# Patient Record
Sex: MALE | Race: WHITE | Employment: UNEMPLOYED | URBAN - METROPOLITAN AREA
[De-identification: names, ages, dates, MRNs, and addresses within clinical notes are randomized per-mention and may not be internally consistent; named-entity substitution may affect disease eponyms.]

---

## 2017-02-07 ENCOUNTER — HOSPITAL ENCOUNTER (EMERGENCY)
Age: 27
Discharge: HOME | End: 2017-02-07
Attending: EMERGENCY MEDICINE
Payer: MEDICARE

## 2017-02-07 VITALS
DIASTOLIC BLOOD PRESSURE: 71 MMHG | TEMPERATURE: 99 F | HEIGHT: 72 IN | BODY MASS INDEX: 39.82 KG/M2 | OXYGEN SATURATION: 94 % | WEIGHT: 294 LBS | RESPIRATION RATE: 23 BRPM | SYSTOLIC BLOOD PRESSURE: 137 MMHG | HEART RATE: 82 BPM

## 2017-02-07 DIAGNOSIS — R60.9 PERIPHERAL EDEMA: ICD-10-CM

## 2017-02-07 DIAGNOSIS — R00.0 TACHYCARDIA: Primary | ICD-10-CM

## 2017-02-07 LAB
ALBUMIN SERPL BCP-MCNC: 4.5 G/DL (ref 3.5–5)
ALBUMIN/GLOB SERPL: 1.1 {RATIO} (ref 1.2–3.5)
ALP SERPL-CCNC: 94 U/L (ref 50–136)
ALT SERPL-CCNC: 42 U/L (ref 12–65)
ANION GAP BLD CALC-SCNC: 11 MMOL/L (ref 7–16)
AST SERPL W P-5'-P-CCNC: 23 U/L (ref 15–37)
ATRIAL RATE: 115 BPM
ATRIAL RATE: 97 BPM
BASOPHILS # BLD AUTO: 0 K/UL (ref 0–0.2)
BASOPHILS # BLD: 0 % (ref 0–2)
BILIRUB SERPL-MCNC: 0.6 MG/DL (ref 0.2–1.1)
BNP SERPL-MCNC: 5 PG/ML
BUN SERPL-MCNC: 13 MG/DL (ref 6–23)
CALCIUM SERPL-MCNC: 9.4 MG/DL (ref 8.3–10.4)
CALCULATED P AXIS, ECG09: 28 DEGREES
CALCULATED P AXIS, ECG09: 70 DEGREES
CALCULATED R AXIS, ECG10: 75 DEGREES
CALCULATED R AXIS, ECG10: 85 DEGREES
CALCULATED T AXIS, ECG11: -16 DEGREES
CALCULATED T AXIS, ECG11: -25 DEGREES
CHLORIDE SERPL-SCNC: 102 MMOL/L (ref 98–107)
CO2 SERPL-SCNC: 25 MMOL/L (ref 21–32)
CREAT SERPL-MCNC: 1.06 MG/DL (ref 0.8–1.5)
DIAGNOSIS, 93000: NORMAL
DIAGNOSIS, 93000: NORMAL
DIASTOLIC BP, ECG02: NORMAL MMHG
DIASTOLIC BP, ECG02: NORMAL MMHG
DIFFERENTIAL METHOD BLD: ABNORMAL
EOSINOPHIL # BLD: 0 K/UL (ref 0–0.8)
EOSINOPHIL NFR BLD: 0 % (ref 0.5–7.8)
ERYTHROCYTE [DISTWIDTH] IN BLOOD BY AUTOMATED COUNT: 13.9 % (ref 11.9–14.6)
GLOBULIN SER CALC-MCNC: 4 G/DL (ref 2.3–3.5)
GLUCOSE SERPL-MCNC: 90 MG/DL (ref 65–100)
HCT VFR BLD AUTO: 46.8 % (ref 41.1–50.3)
HGB BLD-MCNC: 15.6 G/DL (ref 13.6–17.2)
IMM GRANULOCYTES # BLD: 0 K/UL (ref 0–0.5)
IMM GRANULOCYTES NFR BLD AUTO: 0.2 % (ref 0–5)
LYMPHOCYTES # BLD AUTO: 12 % (ref 13–44)
LYMPHOCYTES # BLD: 1.8 K/UL (ref 0.5–4.6)
MCH RBC QN AUTO: 27.7 PG (ref 26.1–32.9)
MCHC RBC AUTO-ENTMCNC: 33.3 G/DL (ref 31.4–35)
MCV RBC AUTO: 83.1 FL (ref 79.6–97.8)
MONOCYTES # BLD: 0.9 K/UL (ref 0.1–1.3)
MONOCYTES NFR BLD AUTO: 6 % (ref 4–12)
NEUTS SEG # BLD: 11.9 K/UL (ref 1.7–8.2)
NEUTS SEG NFR BLD AUTO: 82 % (ref 43–78)
P-R INTERVAL, ECG05: 142 MS
P-R INTERVAL, ECG05: 144 MS
PLATELET # BLD AUTO: 316 K/UL (ref 150–450)
PMV BLD AUTO: 11.5 FL (ref 10.8–14.1)
POTASSIUM SERPL-SCNC: 3.5 MMOL/L (ref 3.5–5.1)
PROT SERPL-MCNC: 8.5 G/DL (ref 6.3–8.2)
Q-T INTERVAL, ECG07: 312 MS
Q-T INTERVAL, ECG07: 340 MS
QRS DURATION, ECG06: 94 MS
QRS DURATION, ECG06: 94 MS
QTC CALCULATION (BEZET), ECG08: 431 MS
QTC CALCULATION (BEZET), ECG08: 431 MS
RBC # BLD AUTO: 5.63 M/UL (ref 4.23–5.67)
SODIUM SERPL-SCNC: 138 MMOL/L (ref 136–145)
SYSTOLIC BP, ECG01: NORMAL MMHG
SYSTOLIC BP, ECG01: NORMAL MMHG
TSH SERPL DL<=0.005 MIU/L-ACNC: 2.43 UIU/ML (ref 0.36–3.74)
VENTRICULAR RATE, ECG03: 115 BPM
VENTRICULAR RATE, ECG03: 97 BPM
WBC # BLD AUTO: 14.6 K/UL (ref 4.3–11.1)

## 2017-02-07 PROCEDURE — 99284 EMERGENCY DEPT VISIT MOD MDM: CPT | Performed by: EMERGENCY MEDICINE

## 2017-02-07 PROCEDURE — 93005 ELECTROCARDIOGRAM TRACING: CPT | Performed by: EMERGENCY MEDICINE

## 2017-02-07 PROCEDURE — 96376 TX/PRO/DX INJ SAME DRUG ADON: CPT | Performed by: EMERGENCY MEDICINE

## 2017-02-07 PROCEDURE — 96374 THER/PROPH/DIAG INJ IV PUSH: CPT | Performed by: EMERGENCY MEDICINE

## 2017-02-07 PROCEDURE — 80053 COMPREHEN METABOLIC PANEL: CPT | Performed by: EMERGENCY MEDICINE

## 2017-02-07 PROCEDURE — 85025 COMPLETE CBC W/AUTO DIFF WBC: CPT | Performed by: EMERGENCY MEDICINE

## 2017-02-07 PROCEDURE — 84443 ASSAY THYROID STIM HORMONE: CPT | Performed by: EMERGENCY MEDICINE

## 2017-02-07 PROCEDURE — 36415 COLL VENOUS BLD VENIPUNCTURE: CPT | Performed by: EMERGENCY MEDICINE

## 2017-02-07 PROCEDURE — 74011250637 HC RX REV CODE- 250/637: Performed by: EMERGENCY MEDICINE

## 2017-02-07 PROCEDURE — 83880 ASSAY OF NATRIURETIC PEPTIDE: CPT | Performed by: EMERGENCY MEDICINE

## 2017-02-07 PROCEDURE — 74011000250 HC RX REV CODE- 250: Performed by: EMERGENCY MEDICINE

## 2017-02-07 RX ORDER — FUROSEMIDE 40 MG/1
40 TABLET ORAL
Status: COMPLETED | OUTPATIENT
Start: 2017-02-07 | End: 2017-02-07

## 2017-02-07 RX ORDER — HYDROCHLOROTHIAZIDE 25 MG/1
25 TABLET ORAL DAILY
Qty: 5 TAB | Refills: 0 | Status: SHIPPED | OUTPATIENT
Start: 2017-02-07 | End: 2017-02-12

## 2017-02-07 RX ORDER — METOPROLOL TARTRATE 50 MG/1
50 TABLET ORAL
Status: COMPLETED | OUTPATIENT
Start: 2017-02-07 | End: 2017-02-07

## 2017-02-07 RX ORDER — METOPROLOL TARTRATE 5 MG/5ML
5 INJECTION INTRAVENOUS ONCE
Status: COMPLETED | OUTPATIENT
Start: 2017-02-07 | End: 2017-02-07

## 2017-02-07 RX ORDER — SODIUM CHLORIDE 0.9 % (FLUSH) 0.9 %
5-10 SYRINGE (ML) INJECTION AS NEEDED
Status: DISCONTINUED | OUTPATIENT
Start: 2017-02-07 | End: 2017-02-07 | Stop reason: HOSPADM

## 2017-02-07 RX ORDER — SODIUM CHLORIDE 0.9 % (FLUSH) 0.9 %
5-10 SYRINGE (ML) INJECTION EVERY 8 HOURS
Status: DISCONTINUED | OUTPATIENT
Start: 2017-02-07 | End: 2017-02-07 | Stop reason: HOSPADM

## 2017-02-07 RX ADMIN — METOPROLOL TARTRATE 50 MG: 50 TABLET ORAL at 20:11

## 2017-02-07 RX ADMIN — METOPROLOL TARTRATE 5 MG: 5 INJECTION INTRAVENOUS at 20:12

## 2017-02-07 RX ADMIN — Medication 5 ML: at 18:49

## 2017-02-07 RX ADMIN — METOPROLOL TARTRATE 5 MG: 5 INJECTION INTRAVENOUS at 18:47

## 2017-02-07 RX ADMIN — FUROSEMIDE 40 MG: 40 TABLET ORAL at 20:32

## 2017-02-07 NOTE — ED TRIAGE NOTES
Pt arrives POV from home c/o swelling to BLE and BUE with nausea and rapid HR. Pt seen by PCP and given Metoprolol for tachycardia but has not started it yet. States began feeling worse with nausea and swelling and came to ED. Denies pain.

## 2017-02-08 NOTE — DISCHARGE INSTRUCTIONS
Change the metoprolol that his doctor wrote this morning to twice a day, every 12 hours. Next Dose tomorrow morning     follow-up with dipstick cardiology, that should be calling you tomorrow  Hopefully they can get your  Echo and Holter started before the Friday appointment, if not they will hopefully try to get him seen sooner on Wednesday or Thursday    Start the hydrochlorothiazide in the morning, for the swelling    Cardiac Arrhythmia: Care Instructions  Your Care Instructions    A cardiac arrhythmia is a change in the normal rhythm of the heart. Your heart may beat too fast or too slow or beat with an irregular or skipping rhythm. A change in the heart's rhythm may feel like a really strong heartbeat or a fluttering in your chest. A severe heart rhythm problem can keep the body from getting the blood it needs. This can result in shortness of breath, lightheadedness, and fainting. You may take medicine to treat your condition. Your doctor may recommend a pacemaker or recommend catheter ablation to destroy small parts of the heart that are causing a rhythm problem. Another possible treatment is an implantable cardioverter-defibrillator (ICD). An ICD is a device that gives the heart a shock to return the heart to a normal rhythm. Follow-up care is a key part of your treatment and safety. Be sure to make and go to all appointments, and call your doctor if you are having problems. It's also a good idea to know your test results and keep a list of the medicines you take. How can you care for yourself at home? General care  · Be safe with medicines. Take your medicines exactly as prescribed. Call your doctor if you think you are having a problem with your medicine. You will get more details on the specific medicines your doctor prescribes. · If you received a pacemaker or an ICD, you will get a fact sheet about it. · Wear medical alert jewelry that says you have an abnormal heart rhythm.  You can buy this at most drugstores. Lifestyle changes  · Eat a heart-healthy diet. · Stay at a healthy weight. Lose weight if you need to. · Avoid nicotine, too much alcohol, and illegal drugs (meth, speed, and cocaine). Also, get enough sleep and do not overeat. · Ask your doctor whether you can take over-the-counter medicines (such as decongestants). These can make your heart beat fast.  · Talk to your doctor about any limits to activities, such as driving, or tasks where you use power tools or ladders. Activity  · Start light exercise if your doctor says you can. Even a small amount will help you get stronger, have more energy, and manage your stress. · If your doctor recommends it, get more exercise. Walking is a good choice. Bit by bit, increase the amount you walk every day. Try for at least 30 minutes on most days of the week. You also may want to swim, bike, or do other activities. · When you exercise, watch for signs that your heart is working too hard. You are pushing too hard if you cannot talk while you exercise. If you become short of breath or dizzy or have chest pain, sit down and rest.  · Check your pulse daily. Place two fingers on the artery at the palm side of your wrist, in line with your thumb. If your heartbeat seems uneven, talk to your doctor. When should you call for help? Call 911 anytime you think you may need emergency care. For example, call if:  · You passed out (lost consciousness). · You have symptoms of a heart attack. These may include:  ¨ Chest pain or pressure, or a strange feeling in the chest.  ¨ Sweating. ¨ Shortness of breath. ¨ Nausea or vomiting. ¨ Pain, pressure, or a strange feeling in the back, neck, jaw, or upper belly or in one or both shoulders or arms. ¨ Lightheadedness or sudden weakness. ¨ A fast or irregular heartbeat. After you call 911, the  may tell you to chew 1 adult-strength or 2 to 4 low-dose aspirin. Wait for an ambulance.  Do not try to drive yourself. · You have signs of a stroke. These include:  ¨ Sudden numbness, paralysis, or weakness in your face, arm, or leg, especially on only one side of your body. ¨ New problems with walking or balance. ¨ Sudden vision changes. ¨ Drooling or slurred speech. ¨ New problems speaking or understanding simple statements, or feeling confused. ¨ A sudden, severe headache that is different from past headaches. Call your doctor now or seek immediate medical care if:  · You are dizzy or lightheaded, or you feel like you may faint. · You have new or increased shortness of breath. · You had surgery and you have signs of infection, such as:  ¨ Increased pain, swelling, warmth, or redness. ¨ Red streaks leading from the cut (incision). ¨ Pus draining from the incision. ¨ A fever. Watch closely for changes in your health, and be sure to contact your doctor if:  · You do not get better as expected. Where can you learn more? Go to http://farrah-arya.info/. Enter Z080 in the search box to learn more about \"Cardiac Arrhythmia: Care Instructions. \"  Current as of: May 5, 2016  Content Version: 11.1  © 4175-5831 [email or web address]. Care instructions adapted under license by [employer] (which disclaims liability or warranty for this information). If you have questions about a medical condition or this instruction, always ask your healthcare professional. [place] [address] any warranty or liability for your use of this information. Leg and Ankle Edema: Care Instructions  Your Care Instructions  Swelling in the legs, ankles, and feet is called edema. It is common after you sit or stand for a while. Long plane flights or car rides often cause swelling in the legs and feet. You may also have swelling if you have to stand for long periods of time at your job. Problems with the veins in the legs (varicose veins) and changes in hormones can also cause swelling. Sometimes the swelling in the ankles and feet is caused by a more serious problem, such as heart failure, infection, blood clots, or liver or kidney disease. Follow-up care is a key part of your treatment and safety. Be sure to make and go to all appointments, and call your doctor if you are having problems. Its also a good idea to know your test results and keep a list of the medicines you take. How can you care for yourself at home? · If your doctor gave you medicine, take it as prescribed. Call your doctor if you think you are having a problem with your medicine. · Whenever you are resting, raise your legs up. Try to keep the swollen area higher than the level of your heart. · Take breaks from standing or sitting in one position. ¨ Walk around to increase the blood flow in your lower legs. ¨ Move your feet and ankles often while you stand, or tighten and relax your leg muscles. · Wear support stockings. Put them on in the morning, before swelling gets worse. · Eat a balanced diet. Lose weight if you need to. · Limit the amount of salt (sodium) in your diet. Salt holds fluid in the body and may increase swelling. When should you call for help? Call 911 anytime you think you may need emergency care. For example, call if:  · You have symptoms of a blood clot in your lung (called a pulmonary embolism). These may include:  ¨ Sudden chest pain. ¨ Trouble breathing. ¨ Coughing up blood. Call your doctor now or seek immediate medical care if:  · You have signs of a blood clot, such as:  ¨ Pain in your calf, back of the knee, thigh, or groin. ¨ Redness and swelling in your leg or groin. · You have symptoms of infection, such as:  ¨ Increased pain, swelling, warmth, or redness. ¨ Red streaks or pus. ¨ A fever. Watch closely for changes in your health, and be sure to contact your doctor if:  · Your swelling is getting worse. · You have new or worsening pain in your legs.   · You do not get better as expected. Where can you learn more? Go to http://farrah-arya.info/. Enter G595 in the search box to learn more about \"Leg and Ankle Edema: Care Instructions. \"  Current as of: May 27, 2016  Content Version: 11.1  © 0418-5034 [employer]. Care instructions adapted under license by [employer] (which disclaims liability or warranty for this information). If you have questions about a medical condition or this instruction, always ask your healthcare professional. [address] any warranty or liability for your use of this information.

## 2017-02-08 NOTE — ED PROVIDER NOTES
HPI Comments: Patient presents with palpitations and heart rates up to 140 and 150. Seen in urgent care about a week ago and placed on Augmentin for sinus infection. Heart rate was noted to be high again. Patient then followed up with PCP today heart rate was noted to be in the 140s 150s, blood work was drawn and a referral to cardiology was fixing to be initiated but family has not heard from cardiology yet  Patient has been on a stable dose of levothyroxine versus hypothyroidism    Denies chest pain or dyspnea, he does feel a bit unsettled by the fast heart rate. Patient is a 31 y.o. male presenting with swelling. The history is provided by the patient and a parent. Swelling    This is a new problem. The problem occurs constantly. The problem has been gradually worsening. Pain location: face, hands, feet. The patient is experiencing no pain. Associated symptoms include nausea. Pertinent negatives include no fever, no diarrhea, no vomiting, no dysuria, no frequency, no headaches, no arthralgias, no chest pain and no back pain. Past Medical History:   Diagnosis Date    Acute sinusitis     ADHD (attention deficit hyperactivity disorder) 11/23/2016    Anxiety     Essential hypertension 11/23/2016    Hyperlipidemia 11/23/2016    Hypothyroidism 11/23/2016    Morbid obesity ([address] 11/23/2016    Psychiatric disorder 11/23/2016     --EXACT NATURE UNKNOWN       Past Surgical History:   Procedure Laterality Date    Hx heent Left      AT AGE 5         Family History:   Problem Relation Age of Onset    Diabetes Father     Hypertension Father        Social History     Social History    Marital status: SINGLE     Spouse name: N/A    Number of children: N/A    Years of education: N/A     Occupational History    Not on file.      Social History Main Topics    Smoking status: Unknown If Ever Smoked    Smokeless tobacco: Not on file    Alcohol use No    Drug use: No    Sexual activity: Not on file Other Topics Concern    Not on file     Social History Narrative    No narrative on file         ALLERGIES: Review of patient's allergies indicates no known allergies. Review of Systems   Constitutional: Negative for chills and fever. HENT: Positive for congestion and facial swelling. Negative for rhinorrhea and sore throat. Eyes: Negative for discharge and redness. Respiratory: Negative for cough and shortness of breath. Cardiovascular: Positive for palpitations and leg swelling. Negative for chest pain. Gastrointestinal: Positive for nausea. Negative for abdominal pain, diarrhea and vomiting. Genitourinary: Negative for dysuria and frequency. Musculoskeletal: Negative for arthralgias and back pain. Skin: Negative for rash. Neurological: Negative for dizziness and headaches. All other systems reviewed and are negative. Vitals:    02/07/17 1930 02/07/17 1945 02/07/17 2000 02/07/17 2009   BP: 126/64 126/66 126/67 141/67   Pulse: 95 99 (!) 102 99   Resp:    16   Temp:       SpO2: 93% 94% 96% 95%   Weight:       Height:                Physical Exam   Constitutional: He is oriented to person, place, and time. He appears well-developed and well-nourished. HENT:   Head: Normocephalic and atraumatic. Mouth/Throat: Oropharynx is clear and moist. No oropharyngeal exudate. Mild facial swelling about the neck and parotid areas bilaterally. Nonpitting  No erythema or induration to suggest infection   Eyes: Conjunctivae and EOM are normal. Pupils are equal, round, and reactive to light. Right eye exhibits no discharge. Left eye exhibits no discharge. No scleral icterus. Neck: Normal range of motion. Neck supple. Cardiovascular: Normal rate, regular rhythm and normal heart sounds. Exam reveals no gallop. No murmur heard. Pulmonary/Chest: Effort normal and breath sounds normal. No respiratory distress. He has no wheezes. He has no rales. Abdominal: Soft.  Bowel sounds are normal. There is no tenderness. There is no guarding. Musculoskeletal: Normal range of motion. He exhibits no edema. Mild nonpitting edema to hands and feet   Neurological: He is alert and oriented to person, place, and time. He exhibits normal muscle tone. cni 2-12 grossly   Skin: Skin is warm and dry. He is not diaphoretic. Psychiatric: He has a normal mood and affect. His behavior is normal.   Nursing note and vitals reviewed. MDM  Number of Diagnoses or Management Options  Peripheral edema: Tachycardia:   Diagnosis management comments: Medical decision making note:  Palpitations to 150 with nonpitting peripheral edema  EKG is sinus tach, TSH is not overly suppressed  Case and EKGs reviewed with cardiology who confirms sinus tach, patient can be seen Friday perhaps with a pre-clinic echo which has been requested by the referral 1. May be able to be seen sooner without pre-clinic testing. Patient feels better after IV Lopressor heart rate now in the 90s  They've been instructed to take the daily Lopressor that was prescribed this morning twice a day instead. This concludes the \"medical decision making note\" part of this emergency department visit note.          Amount and/or Complexity of Data Reviewed  Tests in the radiology section of CPT®: (No orders to display  )  Tests in the medicine section of CPT®: ordered and reviewed (Results Include:    Recent Results (from the past 24 hour(s))  -EKG, 12 LEAD, INITIAL  Collection Time: 02/07/17  4:31 PM       Result                                            Value                         Ref Range                       Systolic BP                                                                     mmHg                            Diastolic BP                                                                    mmHg                            Ventricular Rate                                  115                           BPM                             Atrial Rate 115                           BPM                             P-R Interval                                      144                           ms                              QRS Duration                                      94                            ms                              Q-T Interval                                      312                           ms                              QTC Calculation (Bezet)                           431                           ms                              Calculated P Axis                                 70                            degrees                         Calculated R Axis                                 85                            degrees                         Calculated T Axis                                 -16                           degrees                         Diagnosis                                                                                                   Sinus tachycardia T wave abnormality, consider inferior ischemia Abnormal ECG No previous ECGs available   -CBC WITH AUTOMATED DIFF  Collection Time: 02/07/17  4:40 PM       Result                                            Value                         Ref Range                       WBC                                               14.6 (H)                      4.3 - 11.1 K/uL                 RBC                                               5.63                          4.23 - 5.67 M/uL                HGB                                               15.6                          13.6 - 17.2 g/dL                HCT                                               46.8                          41.1 - 50.3 %                   MCV                                               83.1                          79.6 - 97.8 FL                  MCH                                               27.7                          26.1 - 32.9 PG MCHC                                              33.3                          31.4 - 35.0 g/dL                RDW                                               13.9                          11.9 - 14.6 %                   PLATELET                                          316                           150 - 450 K/uL                  MPV                                               11.5                          10.8 - 14.1 FL                  DF                                                AUTOMATED                                                     NEUTROPHILS                                       82 (H)                        43 - 78 %                       LYMPHOCYTES                                       12 (L)                        13 - 44 %                       MONOCYTES                                         6                             4.0 - 12.0 %                    EOSINOPHILS                                       0 (L)                         0.5 - 7.8 %                     BASOPHILS                                         0                             0.0 - 2.0 %                     IMMATURE GRANULOCYTES                             0.2                           0.0 - 5.0 %                     ABS. NEUTROPHILS                                  11.9 (H)                      1.7 - 8.2 K/UL                  ABS. LYMPHOCYTES                                  1.8                           0.5 - 4.6 K/UL                  ABS. MONOCYTES                                    0.9                           0.1 - 1.3 K/UL                  ABS. EOSINOPHILS                                  0.0                           0.0 - 0.8 K/UL                  ABS. BASOPHILS                                    0.0                           0.0 - 0.2 K/UL                  ABS. IMM.  GRANS.                                  0.0                           0.0 - 0.5 K/UL             -METABOLIC PANEL, COMPREHENSIVE  Collection Time: 02/07/17  4:40 PM       Result                                            Value                         Ref Range                       Sodium                                            138                           136 - 145 mmol/L                Potassium                                         3.5                           3.5 - 5.1 mmol/L                Chloride                                          102                           98 - 107 mmol/L                 CO2                                               25                            21 - 32 mmol/L                  Anion gap                                         11                            7 - 16 mmol/L                   Glucose                                           90                            65 - 100 mg/dL                  BUN                                               13                            6 - 23 MG/DL                    Creatinine                                        1.06                          0.8 - 1.5 MG/DL                 GFR est AA                                        >60                           >60 ml/min/1.73m2               GFR est non-AA                                    >60                           >60 ml/min/1.73m2               Calcium                                           9.4                           8.3 - 10.4 MG/DL                Bilirubin, total                                  0.6                           0.2 - 1.1 MG/DL                 ALT (SGPT)                                        42                            12 - 65 U/L                     AST (SGOT)                                        23                            15 - 37 U/L                     Alk. phosphatase                                  94                            50 - 136 U/L                    Protein, total                                    8.5 (H)                       6.3 - 8.2 g/dL                  Albumin 4.5                           3.5 - 5.0 g/dL                  Globulin                                          4.0 (H)                       2.3 - 3.5 g/dL                  A-G Ratio                                         1.1 (L)                       1.2 - 3.5                  -BNP  Collection Time: 02/07/17  4:40 PM       Result                                            Value                         Ref Range                       BNP                                               5                             pg/mL                      -TSH 3RD GENERATION  Collection Time: 02/07/17  4:40 PM       Result                                            Value                         Ref Range                       TSH                                               2.430                         0.358 - 3.740 uIU/mL       )      ED Course       Procedures

## 2017-02-08 NOTE — ED NOTES
Patient discharged home ambulatory with family. Patient given medication, discharge, and follow up instructions. Patient verbalized understanding and had no questions.

## 2017-02-10 PROBLEM — R60.9 PERIPHERAL EDEMA: Status: ACTIVE | Noted: 2017-02-10

## 2017-02-10 PROBLEM — R00.0 TACHYCARDIA: Status: ACTIVE | Noted: 2017-02-10

## 2017-10-09 PROBLEM — E66.01 OBESITY, CLASS III, BMI 40-49.9 (MORBID OBESITY) (HCC): Status: ACTIVE | Noted: 2017-10-09

## 2018-06-08 PROBLEM — Z71.3 DIETARY COUNSELING: Status: ACTIVE | Noted: 2018-06-08

## 2018-06-08 PROBLEM — E03.9 ACQUIRED HYPOTHYROIDISM: Status: ACTIVE | Noted: 2018-06-08

## 2018-12-14 PROBLEM — Z00.00 MEDICARE ANNUAL WELLNESS VISIT, SUBSEQUENT: Status: ACTIVE | Noted: 2018-12-14

## 2019-09-23 PROBLEM — I10 ESSENTIAL HYPERTENSION: Status: ACTIVE | Noted: 2019-09-23

## 2019-09-23 PROBLEM — Z23 ENCOUNTER FOR IMMUNIZATION: Status: ACTIVE | Noted: 2019-09-23

## 2019-10-23 PROBLEM — Z23 ENCOUNTER FOR IMMUNIZATION: Status: RESOLVED | Noted: 2019-09-23 | Resolved: 2019-10-23

## 2020-01-06 PROBLEM — R06.83 SNORING: Status: ACTIVE | Noted: 2020-01-06

## 2020-01-06 PROBLEM — R29.818 SUSPECTED SLEEP APNEA: Status: ACTIVE | Noted: 2020-01-06

## 2020-01-19 ENCOUNTER — HOSPITAL ENCOUNTER (OUTPATIENT)
Dept: SLEEP MEDICINE | Age: 30
Discharge: HOME | End: 2020-01-19
Payer: MEDICARE

## 2020-01-19 PROCEDURE — 95811 POLYSOM 6/>YRS CPAP 4/> PARM: CPT

## 2020-01-20 ENCOUNTER — HOSPITAL ENCOUNTER (OUTPATIENT)
Dept: NUTRITION | Age: 30
Discharge: HOME | End: 2020-01-20
Attending: PSYCHIATRY & NEUROLOGY
Payer: MEDICARE

## 2020-01-20 PROCEDURE — 97802 MEDICAL NUTRITION INDIV IN: CPT

## 2020-01-20 NOTE — PROGRESS NOTES
NUTRITION ASSESSMENT  Patient History:  Pt referred by practitioner Delmar Gonzalez with diagnosis of morbid obesity with BMI of 45-49.9. Medical history remarkable for HTN, high cholesterol, hypothyroid, ADHD, and psychiatric disorder- exact nature unknown, and suspected sleep apnea. Family medical history remarkable for father having diabetes. Father is present to appt today to bring patient. Father notes pt has a mental disability. Pt's mother and patient filled out nutrition assessment forms per father. · Previous Dieting Attempts/Current Knowledge: Has attempted no diets in the past. Voices poor knowledge regarding healthy food choices and no prior nutrition education. Father however states that since father has diabetes, pt's family attempts to guide pt in appropriate food choices and portions. Father reports that the patient has a tendency to go back for seconds at meal times, to go to the kitchen after family has gone to bed and have snacks.  Lifestyle/Cultural/Family Influence: Father notes that pt is unemployed r/t to disability. Family prepares meals for pt but pt makes decisions about how much to eat. Father states he tries to encourage pt to have healthier intake.  Motivation: When asked, pt notes he is interested in a \"healthy eating plan. \"     Support: Father, mother     Barriers: food-and nutrition-related knowledge deficit. Pt's disability may present a barrier. Potentially boredom/emotional eating is a barrier.  Strategies to Address Barriers: See Nutrition Counseling and Motivational Interviewing (as below). Stage of Change (Transtheoretical Model): Behaviors indicate current stage of change is: Preparation/ Contemplation. Unable to determine if pt is ready for action steps. Pt notes he can Mercedes. \" Unable to determine pt is ready to begin steps for compliance with recommendations.       Anthropometrics:   Ht: 6' and 5'9\" both in EMR; Pt family note 6'; referring provider note 5'9\" (1.753m)  Wt:304lb (137.9kg) EMR   BMI: 44.9 (Obesity class III)   IBW for ht: 160lb +/- 10%   Weight History:  WT / BMI WEIGHT BODY MASS INDEX   1/6/2020 304 lb 44.89 kg/m2   12/16/2019 315 lb 46.52 kg/m2   9/23/2019 304 lb 44.89 kg/m2   6/25/2019 300 lb 44.3 kg/m2   5/28/2019 301 lb 44.45 kg/m2   3/19/2019 298 lb 44.01 kg/m2   12/14/2018 292 lb 43.12 kg/m2   12/4/2018 292 lb 3.2 oz 43.15 kg/m2   9/14/2018 284 lb 41.94 kg/m2   9/11/2018 285 lb 42.09 kg/m2   6/19/2018 295 lb 3.2 oz 43.59 kg/m2     Nutrition Focused Physical Findings:   N/A. Nutritionally Relevant Medications:  metoprolol  amlodipine  simvastatin  Synthroid    Supplements/Vitamins/Herbs:  none    Nutritionally Relevant Labs (1/6/20):  Glucose 127   Triglyceride 171    Physical Activity:  None; Pt reports mainly sitting throughout the day    Sleep Habits:  7-8 hrs sleep/night recorded by pt; father notes pt is up late; often sleeps for a period in the day hours    Food and Nutrient Intake:   Based on reported usual intake, pt consumes 3meals/day with frequent snacks. Dines out at 1-2 times/ week. Appears able to obtain appropriate nutritional choices as desired. Father states healthy foods are available in the home. Diet Recall:  Breakfast: 2 pieces of toast  Lunch: chicken salad; crackers  Dinner: Manwich beef sandwich; pork-and-beans, chips  Snacks: popcorn, Little Chante cakes  Beverages: 8-16 oz water/day, 6 (12oz) diet Pepsisoda/day, occasional sweet tea, 0oz coffee/day    Diet Appears:   low in calcium containing choices   low in servings of fruits and vegetables   low in fiber   low in protein at breakfast   high in added sugar   low in heart healthy fats    Food Allergies/Intolerances: none.     Estimated Nutritional Needs:  EER: 2797 kcal/day [place] [place] factor 1.2 +/- 10% margin of error; Range (0834-9245 kcal/day) for current weight  EER for weight loss: 2500 - 500 = 2000 kcal/day  Carbohydrates: 200 g/day (40% of kcal) or 60 g/meal(3) with 15-30 g/snack(1) (Approximately 600kcal/meal with 200 kcal/snack)  Protein: 125g/day (25% of kcal)  Fat: 78 g/day (35% of kcal) Heart- healthy fats emphasized with lists given. Fluids: 2 L/day (1 ml/kcal)       NUTRITION DIAGNOSIS:   Obesity r/t history of excessive oral intake and food and nutrition-related knowledge deficit regarding appropriate food choices and eating patterns to maintain healthy weight as evidenced by food recall and eating pattern described and BMI 44.9. NUTRITION INTERVENTION:  Appointment length: 60 minutes. Nutrition Education:  Purpose of nutrition education: To provide pt with education to increase knowledge for healthful po intake and to provide a meal plan/ meal template as an educational resource for reference in building healthy, balanced meals and snacks. Recommended modifications:   · Eat 3 balanced meals per with 1 snack eating every 4 hours. · Take 20 minutes minimum for meal enjoyment and leave behind any bites of food after pt feels full and satisfied. · 80/20 rule for healthy food choices/ fun food choices     CONTENT for meal planning: The Plate Method:  · Educated pt on balancing intake of macronutrients at meals and snacks. Discussed the importance of adequate intake of all macronutrients for satiety and satisfaction. · Discussed proportions on plate: 1/2 non-starchy vegetables, 1/4 protein, 1/4 starch. Reviewed additional CHO servings for pt. Stressed the importance of healthy fat with every meal (lists given)  · Demonstrated appropriate portion sizes for various food groups and strategies for portion control. · Carbohydrates: Reviewed food groups containing CHO, provided food examples. Encouraged obtaining CHO from a variety of food groups to vary micronutrient intake, choosing less processed carbs with higher fiber.  Encouraged decreasing intake of refined CHO foods/beverages with limited nutritional value and/or those high in added sugar. Reviewed list of starchy vegetables. · Protein: Reviewed food groups containing protein, provided food examples. Stressed the importance of adequate protein intake with each meal/snack to promote satiety following meals and maintenance of lean body mass. · Fat: Educated pt on importance of essential fats in diet. Advised of food sources. Explained the need to increase sources of omega 3 fats and gave products to look for. Educated pt on trans fats and harm caused with consumption. APPLICATION:  Skill development using Meal Plan:  · Provided pt with the MyPlate Template meal planner (as above) and educated pt on individualized portions. Used hand models and measuring cups to demonstrate appropriate portion sizes. Encouraged pt to use the meal plan as a teaching tool learn about food groups and appropriate portion sizes for present activity level. Advised using portions as a guide and to rely on kcal level if needed when dining out. · Explained to pt how to use the meal planner sheets with the meal plan. RD reviewed with father and pt 3 sample meals composed in office using meal planner. Nutrition Counseling: Motivational Interviewing:      Explained to pt the relationship between food choices and health and reducing risk for disease. Explained to pt the impact of over-nutrition and excessive intake of inflammatory foods/ processed foods in the disease process for chronic disease.  Discussed pts intake and activity patterns as above. Reviewed 3 broad areas that impact weight: food choices, physical activity level, and energy balance. Advised that weight management should be viewed as a learning process and best accomplished by making small goals that pt can continue with for life. Essentially building health lifestyle that will facilitate weight loss and maintenance.  Discussed in length the need to add physical activity to pt's daily routine (MD may need to advise).  Encouraged pt to find something her enjoys; father notes that the patient can attend a gym that they have membership for.  Advised pt on the qualities of individuals who are able to lose weight and maintain that wt loss. Highlighted that maintainers tend to continue in the dietary patterns and physical activity patterns they established in order to lose the weight. Advised designing an approach with small goals that pt can practically plan on continuing for life (new behaviors). Encouraged a focus on healthful intake and to avoid a focus on weight numbers.  Engaged pt in a discussion of flexibility for food choices balanced with a focus on health. Recommended an 80/20 or 90/10  approach for healthful intake verses fun foods. Self-monitoring:   · Advised pt that goals of this program are to educate pt an appropriate intake and nutrition planning for health and weight management and to address barriers to action steps. RD support in the first months of behavior change can help with successful transition to self-monitoring. The long term goal is to develop self- monitoring, self-regulating behaviors for life-long management skills. Advised pt that lifestyle changes will usually be required. · Encouraged pt to use meal planner sheets for a time to self-monitor intake compared to plans. Will review for pt if available at f/u. Cognitive restructuring:   · Discussed complete health (physical, emotional, social, vocational, intellectual, and spiritual) and how all of these areas affect each other and also can affect eating habits when individual areas of health may need to be addressed. · Pt's po intake patterns as described by father indicates barriers as boredom eating/ emotional eating. This may affect attempts with behavior changes. Goal Setting:  Pt was somewhat reluctant to committing to specific agreement to goal  RD Goal: Pt will eat 3 balanced meals per with 1 snack eating every 4 hours.   Plan: Pt will use MyPlate template as a guide to structure eating in timing, food choices, macronutrient amounts, and portion sizes for goal stated (as above). RD Goal: Pt will increase frequency and consistency of physical activity to facilitate wt loss. Plan: RD encourage pt to find activities that he may enjoy; introduced to pt as a way to engage his mind in an activity to bring stimulation rahter than using food intake as stimulation. Materials Given:  Weight Management Basics  MyPlate Template Meal Plan with portions for 2000kcal/day  Meal Planner Sheets  Healthy Eating Food Guide (NIH)   Mindful Eating Handout    MONITORING AND EVALUATION:  RECOMMENDATIONS FOR CONTINUED APPOINTMENTS/ SUPPORT:  · Pt and father were attentive during appointment. Father interjected often noting pt's excessive intake. Unsure of impact on pt to hear comments. It appears pt will consider the information given but will need continued support and education. · Pt would benefit from continued support with appts with dietitian until action steps are established for a minimum of 6 months. · Frequency depends on any setbacks or need for support. · Best practice guidelines indicate pts with most success follow up with RD:  regularly, every 2 weeks, especially when beginning new dietary changes (2013 AHA/ACC/TOS Guideline for the Management of Overweight and Obesity in Adults). Follow up appt: Pt did not schedule. Follow-up Monitoring Plans: Will call pt in 2 weeks per pt anf father request to check on any f/u needs.     Billy Leblanc MS, RD,CSSD, LD  W: [phone]

## 2020-01-29 PROBLEM — G47.10 HYPERSOMNIA: Status: ACTIVE | Noted: 2020-01-29

## 2020-01-29 PROBLEM — G47.33 OSA (OBSTRUCTIVE SLEEP APNEA): Status: ACTIVE | Noted: 2020-01-06

## 2020-01-29 PROBLEM — G47.34 NOCTURNAL HYPOXEMIA: Status: ACTIVE | Noted: 2020-01-29

## 2020-02-12 ENCOUNTER — DOCUMENTATION ONLY (OUTPATIENT)
Dept: NUTRITION | Age: 30
End: 2020-02-12

## 2020-02-12 NOTE — PROGRESS NOTES
Nutrition Counseling:  Pt referred by Dr. Keyla Savage. Made attempt to speak with pt's family regarding any f/u needs. Left VM to request a return call.     Elizabeth Last, MS, RD, CSSD, LD  W: [phone]

## 2020-04-27 PROBLEM — G47.33 SEVERE OBSTRUCTIVE SLEEP APNEA: Status: ACTIVE | Noted: 2020-01-06

## 2020-04-27 PROBLEM — G47.33 OBSTRUCTIVE SLEEP APNEA SYNDROME: Status: RESOLVED | Noted: 2020-01-06 | Resolved: 2020-04-27

## 2020-06-08 ENCOUNTER — DOCUMENTATION ONLY (OUTPATIENT)
Dept: NUTRITION | Age: 30
End: 2020-06-08

## 2020-06-08 NOTE — PROGRESS NOTES
Nutrition Counseling:  Pt referred by Dr. Sandy Innocent. . Pt's family has not returned a call to schedule a f/u appt. Will close referral on this end.     Grace Rinne, MS, [address], [address], LD  W: [phone]  C: [phone]

## 2022-03-18 PROBLEM — G47.34 NOCTURNAL HYPOXEMIA: Status: ACTIVE | Noted: 2020-01-29

## 2022-03-19 PROBLEM — Z00.00 MEDICARE ANNUAL WELLNESS VISIT, SUBSEQUENT: Status: ACTIVE | Noted: 2018-12-14

## 2022-03-19 PROBLEM — Z71.3 DIETARY COUNSELING: Status: ACTIVE | Noted: 2018-06-08

## 2022-03-19 PROBLEM — G47.10 HYPERSOMNIA: Status: ACTIVE | Noted: 2020-01-29

## 2022-03-19 PROBLEM — E03.9 ACQUIRED HYPOTHYROIDISM: Status: ACTIVE | Noted: 2018-06-08

## 2022-03-19 PROBLEM — I10 ESSENTIAL HYPERTENSION: Status: ACTIVE | Noted: 2019-09-23

## 2022-03-19 PROBLEM — R60.9 PERIPHERAL EDEMA: Status: ACTIVE | Noted: 2017-02-10

## 2022-03-19 PROBLEM — R60.0 PERIPHERAL EDEMA: Status: ACTIVE | Noted: 2017-02-10

## 2022-03-20 PROBLEM — G47.33 OSA (OBSTRUCTIVE SLEEP APNEA): Status: ACTIVE | Noted: 2020-01-06

## 2022-03-20 PROBLEM — R00.0 TACHYCARDIA: Status: ACTIVE | Noted: 2017-02-10

## 2022-05-16 PROBLEM — J30.9 ALLERGIC RHINITIS: Status: ACTIVE | Noted: 2022-05-16

## 2022-08-17 ENCOUNTER — TELEMEDICINE (OUTPATIENT)
Dept: PRIMARY CARE CLINIC | Facility: CLINIC | Age: 32
End: 2022-08-17
Payer: MEDICARE

## 2022-08-17 DIAGNOSIS — E66.01 MORBID (SEVERE) OBESITY DUE TO EXCESS CALORIES (HCC): ICD-10-CM

## 2022-08-17 DIAGNOSIS — Z71.3 DIETARY COUNSELING AND SURVEILLANCE: ICD-10-CM

## 2022-08-17 DIAGNOSIS — J30.9 ALLERGIC RHINITIS, UNSPECIFIED SEASONALITY, UNSPECIFIED TRIGGER: ICD-10-CM

## 2022-08-17 DIAGNOSIS — G47.33 OBSTRUCTIVE SLEEP APNEA (ADULT) (PEDIATRIC): ICD-10-CM

## 2022-08-17 DIAGNOSIS — E78.5 HYPERLIPIDEMIA, UNSPECIFIED HYPERLIPIDEMIA TYPE: ICD-10-CM

## 2022-08-17 DIAGNOSIS — I10 ESSENTIAL (PRIMARY) HYPERTENSION: Primary | ICD-10-CM

## 2022-08-17 DIAGNOSIS — E03.9 HYPOTHYROIDISM, UNSPECIFIED TYPE: ICD-10-CM

## 2022-08-17 PROCEDURE — G8428 CUR MEDS NOT DOCUMENT: HCPCS | Performed by: FAMILY MEDICINE

## 2022-08-17 PROCEDURE — G8421 BMI NOT CALCULATED: HCPCS | Performed by: FAMILY MEDICINE

## 2022-08-17 PROCEDURE — 99214 OFFICE O/P EST MOD 30 MIN: CPT | Performed by: FAMILY MEDICINE

## 2022-08-17 PROCEDURE — 4004F PT TOBACCO SCREEN RCVD TLK: CPT | Performed by: FAMILY MEDICINE

## 2022-08-17 RX ORDER — AMLODIPINE BESYLATE 10 MG/1
10 TABLET ORAL DAILY
Qty: 90 TABLET | Refills: 0 | Status: SHIPPED | OUTPATIENT
Start: 2022-08-17

## 2022-08-17 RX ORDER — LEVOTHYROXINE SODIUM 0.07 MG/1
75 TABLET ORAL DAILY
Qty: 90 TABLET | Refills: 0 | Status: SHIPPED | OUTPATIENT
Start: 2022-08-17

## 2022-08-17 RX ORDER — SIMVASTATIN 40 MG
40 TABLET ORAL NIGHTLY
Qty: 90 TABLET | Refills: 0 | Status: SHIPPED | OUTPATIENT
Start: 2022-08-17

## 2022-08-17 NOTE — PROGRESS NOTES
bp 120/70    [facility] - JEAN PIERRE Herronyrann marieien [address] [place] [address] [address]  Office : [phone]  Fax : [phone]      Pt was seen by synchronous (real-time) audio-video technology   I was at my home office while conducting this encounter  Pts  healthcare decision maker is aware that this patient-initiated Telehealth encounter is a billable service, with coverage as determined by her insurance carrier. She is aware that she may receive a bill and has provided verbal consent to proceed:         Subjective: The patient is a 27 y.o. male  who presents for f/u on multiple chronic medical conditions-good compliance with medications-no new concerns-pt here to get routeine labs and need refill on meds. no cardiopulmonary symptoms  Hypertension--Pt BP been controlled with meds  Prediabetes -pts blood sugar stable on med  Hyperlipidemia--pts on low carb diet and low fat diet -stable on med  Gerd -stable on diet /med  Thyroid problem- stable  Pt uses cpap for JATIN  Pt not done lab work for a while-pt to do the same soon          Patient Active Problem List   Diagnosis Code    ADHD (attention deficit hyperactivity disorder) F90.9    Hyperlipidemia E78.5    Morbid obesity with BMI of 40.0-44.9, adult ([address] E66.01, Z68.41    Psychiatric disorder F99    Tachycardia R00.0    Peripheral edema R60.9    Acquired hypothyroidism E03.9    Dietary counseling Z71.3    Medicare annual wellness visit, subsequent Z00.00    BMI 40.0-44.9, adult ([address] Z68.41    Essential hypertension I10    JATIN (obstructive sleep apnea) G47.33    Nocturnal hypoxemia G47.34    Hypersomnia G47.10       Past Medical History:   Diagnosis Date    Acute sinusitis     ADHD (attention deficit hyperactivity disorder) 11/23/2016    Anxiety     Essential hypertension 11/23/2016    Hyperlipidemia 11/23/2016    Hypothyroidism 11/23/2016    Morbid obesity ([address] 11/23/2016    Psychiatric disorder 11/23/2016    --EXACT NATURE UNKNOWN    Tachycardia 2/10/2017 Past Surgical History:   Procedure Laterality Date    HX HEENT Left     AT AGE 5       Social History     Socioeconomic History    Marital status: SINGLE     Spouse name: Not on file    Number of children: Not on file    Years of education: Not on file    Highest education level: Not on file   Occupational History    Not on file   Tobacco Use    Smoking status: Never Smoker    Smokeless tobacco: Never Used   Substance and Sexual Activity    Alcohol use: No    Drug use: No    Sexual activity: Not on file   Other Topics Concern    Not on file   Social History Narrative    Not on file     Social Determinants of Health     Financial Resource Strain:     Difficulty of Paying Living Expenses: Not on file   Food Insecurity:     Worried About [employer] [employer] in the Last Year: Not on file    Ran Out of Food in the Last Year: Not on file   Transportation Needs:     Lack of Transportation (Medical): Not on file    Lack of Transportation (Non-Medical):  Not on file   Physical Activity:     Days of Exercise per Week: Not on file    Minutes of Exercise per Session: Not on file   Stress:     Feeling of Stress : Not on file   Social Connections:     Frequency of Communication with Friends and Family: Not on file    Frequency of Social Gatherings with Friends and Family: Not on file    Attends [employer] Services: Not on file    Active Member of Clubs or Organizations: Not on file    Attends Club or Organization Meetings: Not on file    Marital Status: Not on file   Intimate Partner Violence:     Fear of Current or Ex-Partner: Not on file    Emotionally Abused: Not on file    Physically Abused: Not on file    Sexually Abused: Not on file   Housing Stability:     Unable to Pay for Housing in the Last Year: Not on file    Number of [place] in the Last Year: Not on file    Unstable Housing in the Last Year: Not on file       No Known Allergies    Current Outpatient Medications   Medication Sig Dispense Refill levothyroxine (SYNTHROID) 75 mcg tablet TAKE 1 TABLET BY MOUTH ONCE DAILY BEFORE  BREAKFAST 90 Tablet 0    amLODIPine (NORVASC) 10 mg tablet Take 1 Tablet by mouth daily. 90 Tablet 0    metoprolol tartrate (LOPRESSOR) 25 mg tablet Take 1 Tablet by mouth two (2) times a day. 180 Tablet 0    simvastatin (ZOCOR) 40 mg tablet TAKE 1 TABLET BY MOUTH ONCE DAILY AT  NIGHT 90 Tablet 0    atomoxetine (STRATTERA) 80 mg capsule Take 1 Cap by mouth daily. Indications: attention deficit disorder with hyperactivity 90 Cap 1         Review of Systems   Constitutional: Negative. HENT: Negative. Eyes: Negative. Respiratory: Negative. Cardiovascular: Negative. Gastrointestinal: Negative. Genitourinary: Negative. Musculoskeletal: Negative. Skin: Negative. Neurological: Negative. Endo/Heme/Allergies: Negative. Psychiatric/Behavioral: Negative. Add           Objective: There were no vitals taken for this visit. Physical Exam   Constitutional: He is oriented to person, place, and time. He appears well-developed. Obese     HENT:   Head: Atraumatic. Right Ear: External ear normal.   Left Ear: External ear normal.   Eyes: EOM are normal.   Neck: Normal range of motion. Pulmonary/Chest: Effort normal.     Neurological: He is alert and oriented to person, place, and time. Elena Remedies Psychiatric: He has a normal mood and affect. His behavior is normal. Judgment normal.         . RESULTRCNTNC(15W    ASSESSMENT/PLAN:    )  Chronic Conditions Addressed Today       1. ADHD (attention deficit hyperactivity disorder)     Overview      Seeing psychiatrist  Stable on med          Relevant Orders     METABOLIC PANEL, COMPREHENSIVE     LIPID PANEL     T4, FREE     TSH 3RD GENERATION    2.  Hyperlipidemia     Overview      Stable on statin          Relevant Medications     simvastatin (ZOCOR) 40 mg tablet     Other Relevant Orders     METABOLIC PANEL, COMPREHENSIVE     LIPID PANEL     T4, FREE     TSH 3RD GENERATION    3. Acquired hypothyroidism     Overview      STABLE ON MED  LABS TODAY  REFILLED          Relevant Medications     levothyroxine (SYNTHROID) 75 mcg tablet     simvastatin (ZOCOR) 40 mg tablet     Other Relevant Orders     METABOLIC PANEL, COMPREHENSIVE     LIPID PANEL     T4, FREE     TSH 3RD GENERATION    4. Dietary counseling     Overview      Low calorie dist discussed          Relevant Orders     METABOLIC PANEL, COMPREHENSIVE     LIPID PANEL     T4, FREE     TSH 3RD GENERATION    5. Morbid obesity with BMI of 40.0-44.9, adult (HCC)     Overview      Weight loss encouraged          Relevant Medications     levothyroxine (SYNTHROID) 75 mcg tablet     Other Relevant Orders     METABOLIC PANEL, COMPREHENSIVE     LIPID PANEL     T4, FREE     TSH 3RD GENERATION    6. Essential hypertension - Primary     Overview      Stable with med  Refilled  Labs   Annual eye exam recommended  F/u in 3 months            Relevant Medications     amLODIPine (NORVASC) 10 mg tablet     metoprolol tartrate (LOPRESSOR) 25 mg tablet     simvastatin (ZOCOR) 40 mg tablet     Other Relevant Orders     METABOLIC PANEL, COMPREHENSIVE     LIPID PANEL     T4, FREE     TSH 3RD GENERATION    7. JATIN (obstructive sleep apnea)     Overview      Sleep study          Relevant Orders     METABOLIC PANEL, COMPREHENSIVE     LIPID PANEL     T4, FREE     TSH 3RD GENERATION          Orders Placed This Encounter    COMPREHENSIVE METABOLIC PANEL    LIPID PANEL    T4, FREE    TSH 3RD GENERATION    levothyroxine (SYNTHROID) 75 mcg tablet     Sig: TAKE 1 TABLET BY MOUTH ONCE DAILY BEFORE  BREAKFAST     Dispense:  90 Tablet     Refill:  0    amLODIPine (NORVASC) 10 mg tablet     Sig: Take 1 Tablet by mouth daily. Dispense:  90 Tablet     Refill:  0     Please consider 90 day supplies to promote better adherence    metoprolol tartrate (LOPRESSOR) 25 mg tablet     Sig: Take 1 Tablet by mouth two (2) times a day.      Dispense:  180 Tablet Refill:  0    simvastatin (ZOCOR) 40 mg tablet     Sig: TAKE 1 TABLET BY MOUTH ONCE DAILY AT  NIGHT     Dispense:  90 Tablet     Refill:  0     Results for orders placed or performed in visit on 01/06/20   TSH 3RD GENERATION   Result Value Ref Range    TSH 3.910 0.450 - 0.043 uIU/mL   METABOLIC PANEL, COMPREHENSIVE   Result Value Ref Range    Glucose 127 (H) 65 - 99 mg/dL    BUN 13 6 - 20 mg/dL    Creatinine 0.95 0.76 - 1.27 mg/dL    GFR est non- >59 mL/min/1.73    GFR est  >59 mL/min/1.73    BUN/Creatinine ratio 14 9 - 20    Sodium 145 (H) 134 - 144 mmol/L    Potassium 4.1 3.5 - 5.2 mmol/L    Chloride 106 96 - 106 mmol/L    CO2 19 (L) 20 - 29 mmol/L    Calcium 10.2 8.7 - 10.2 mg/dL    Protein, total 7.9 6.0 - 8.5 g/dL    Albumin 5.0 3.5 - 5.5 g/dL    GLOBULIN, TOTAL 2.9 1.5 - 4.5 g/dL    A-G Ratio 1.7 1.2 - 2.2    Bilirubin, total 0.4 0.0 - 1.2 mg/dL    Alk. phosphatase 76 39 - 117 IU/L    AST (SGOT) 22 0 - 40 IU/L    ALT (SGPT) 40 0 - 44 IU/L   LIPID PANEL   Result Value Ref Range    Cholesterol, total 178 100 - 199 mg/dL    Triglyceride 171 (H) 0 - 149 mg/dL    HDL Cholesterol 41 >39 mg/dL    VLDL, calculated 34 5 - 40 mg/dL    LDL, calculated 103 (H) 0 - 99 mg/dL   T4, FREE   Result Value Ref Range    T4, Free 1.73 0.82 - 1.77 ng/dL       Follow-up and Dispositions    Return in about 3 months (around 5/15/2022). Due to this being a TeleHealth evaluation, many elements of the physical examination are unable to be assessed. We discussed the expected course, resolution and complications of the diagnosis(es) in detail. Medication risks, benefits, costs, interactions, and alternatives were discussed as indicated. I advised her to contact the office if her condition worsens, changes or fails to improve as anticipated. She expressed understanding with the diagnosis(es) and plan.          Pursuant to the emergency declaration under the [address] [place], [address] waiver authority and the [employer] Act, this Virtual  Visit was conducted, with patient's consent, to reduce the patient's risk of exposure to COVID-19 and provide continuity of care for an established patient. Services were provided through a video synchronous discussion virtually to substitute for in-person clinic visit. Kerrie Bucio MD

## 2022-11-12 DIAGNOSIS — E78.5 HYPERLIPIDEMIA, UNSPECIFIED HYPERLIPIDEMIA TYPE: ICD-10-CM

## 2022-11-12 DIAGNOSIS — E03.9 HYPOTHYROIDISM, UNSPECIFIED TYPE: ICD-10-CM

## 2022-11-12 DIAGNOSIS — I10 ESSENTIAL (PRIMARY) HYPERTENSION: ICD-10-CM

## 2022-11-17 RX ORDER — SIMVASTATIN 40 MG
TABLET ORAL
Qty: 90 TABLET | Refills: 0 | OUTPATIENT
Start: 2022-11-17

## 2022-11-17 RX ORDER — AMLODIPINE BESYLATE 10 MG/1
TABLET ORAL
Qty: 90 TABLET | Refills: 0 | OUTPATIENT
Start: 2022-11-17

## 2022-11-17 RX ORDER — LEVOTHYROXINE SODIUM 0.07 MG/1
TABLET ORAL
Qty: 90 TABLET | Refills: 0 | OUTPATIENT
Start: 2022-11-17

## 2022-12-22 ENCOUNTER — TELEPHONE (OUTPATIENT)
Dept: PRIMARY CARE CLINIC | Facility: CLINIC | Age: 32
End: 2022-12-22

## 2022-12-23 DIAGNOSIS — I10 ESSENTIAL (PRIMARY) HYPERTENSION: ICD-10-CM

## 2022-12-23 RX ORDER — AMLODIPINE BESYLATE 10 MG/1
10 TABLET ORAL DAILY
Qty: 90 TABLET | Refills: 0 | Status: SHIPPED | OUTPATIENT
Start: 2022-12-23

## 2022-12-23 NOTE — TELEPHONE ENCOUNTER
----- Message from Adam Jorgensen sent at 12/22/2022 10:09 AM EST -----  Subject: Refill Request    QUESTIONS  Name of Medication? amLODIPine (NORVASC) 10 MG tablet  Patient-reported dosage and instructions? 10 mg daily   How many days do you have left? 0  Preferred Pharmacy? [address]  Pharmacy phone number (if available)? [phone]  Additional Information for Provider? Pt has appt on 01/06  ---------------------------------------------------------------------------  --------------  CALL BACK INFO  What is the best way for the office to contact you? OK to leave message on   voicemail  Preferred Call Back Phone Number? [phone]  ---------------------------------------------------------------------------  --------------  SCRIPT ANSWERS  Relationship to Patient?  Self

## 2022-12-23 NOTE — TELEPHONE ENCOUNTER
Refill request   Amlodipine 10 mg - Take 1 tablet by mouth daily    Patient has a f./u appointment scheduled for 1/6/23 with Dr. Veronique Hanley   Please send RX to [facility] on [address]  in [place]      Rx pended   Thank you!

## 2023-03-27 ENCOUNTER — TELEMEDICINE (OUTPATIENT)
Dept: PRIMARY CARE CLINIC | Facility: CLINIC | Age: 33
End: 2023-03-27
Payer: MEDICARE

## 2023-03-27 DIAGNOSIS — I10 ESSENTIAL (PRIMARY) HYPERTENSION: ICD-10-CM

## 2023-03-27 DIAGNOSIS — G47.33 OBSTRUCTIVE SLEEP APNEA (ADULT) (PEDIATRIC): ICD-10-CM

## 2023-03-27 DIAGNOSIS — Z71.3 DIETARY COUNSELING AND SURVEILLANCE: ICD-10-CM

## 2023-03-27 DIAGNOSIS — Z00.00 MEDICARE ANNUAL WELLNESS VISIT, SUBSEQUENT: Primary | ICD-10-CM

## 2023-03-27 DIAGNOSIS — J30.9 ALLERGIC RHINITIS, UNSPECIFIED SEASONALITY, UNSPECIFIED TRIGGER: ICD-10-CM

## 2023-03-27 DIAGNOSIS — F90.0 ATTENTION-DEFICIT HYPERACTIVITY DISORDER, PREDOMINANTLY INATTENTIVE TYPE: ICD-10-CM

## 2023-03-27 DIAGNOSIS — E03.9 HYPOTHYROIDISM, UNSPECIFIED TYPE: ICD-10-CM

## 2023-03-27 DIAGNOSIS — E66.01 MORBID (SEVERE) OBESITY DUE TO EXCESS CALORIES (HCC): ICD-10-CM

## 2023-03-27 DIAGNOSIS — E78.5 HYPERLIPIDEMIA, UNSPECIFIED HYPERLIPIDEMIA TYPE: ICD-10-CM

## 2023-03-27 PROCEDURE — G8421 BMI NOT CALCULATED: HCPCS | Performed by: FAMILY MEDICINE

## 2023-03-27 PROCEDURE — G0439 PPPS, SUBSEQ VISIT: HCPCS | Performed by: FAMILY MEDICINE

## 2023-03-27 PROCEDURE — G8484 FLU IMMUNIZE NO ADMIN: HCPCS | Performed by: FAMILY MEDICINE

## 2023-03-27 PROCEDURE — 99214 OFFICE O/P EST MOD 30 MIN: CPT | Performed by: FAMILY MEDICINE

## 2023-03-27 PROCEDURE — G8427 DOCREV CUR MEDS BY ELIG CLIN: HCPCS | Performed by: FAMILY MEDICINE

## 2023-03-27 PROCEDURE — 4004F PT TOBACCO SCREEN RCVD TLK: CPT | Performed by: FAMILY MEDICINE

## 2023-03-27 RX ORDER — LEVOTHYROXINE SODIUM 0.07 MG/1
75 TABLET ORAL DAILY
Qty: 90 TABLET | Refills: 0 | Status: SHIPPED | OUTPATIENT
Start: 2023-03-27

## 2023-03-27 RX ORDER — AMLODIPINE BESYLATE 10 MG/1
10 TABLET ORAL DAILY
Qty: 90 TABLET | Refills: 0 | Status: SHIPPED | OUTPATIENT
Start: 2023-03-27

## 2023-03-27 RX ORDER — SIMVASTATIN 40 MG
40 TABLET ORAL NIGHTLY
Qty: 90 TABLET | Refills: 0 | Status: SHIPPED | OUTPATIENT
Start: 2023-03-27

## 2023-03-27 SDOH — ECONOMIC STABILITY: HOUSING INSECURITY
IN THE LAST 12 MONTHS, WAS THERE A TIME WHEN YOU DID NOT HAVE A STEADY PLACE TO SLEEP OR SLEPT IN A SHELTER (INCLUDING NOW)?: NO

## 2023-03-27 SDOH — ECONOMIC STABILITY: FOOD INSECURITY: WITHIN THE PAST 12 MONTHS, THE FOOD YOU BOUGHT JUST DIDN'T LAST AND YOU DIDN'T HAVE MONEY TO GET MORE.: NEVER TRUE

## 2023-03-27 SDOH — ECONOMIC STABILITY: FOOD INSECURITY: WITHIN THE PAST 12 MONTHS, YOU WORRIED THAT YOUR FOOD WOULD RUN OUT BEFORE YOU GOT MONEY TO BUY MORE.: NEVER TRUE

## 2023-03-27 SDOH — ECONOMIC STABILITY: INCOME INSECURITY: HOW HARD IS IT FOR YOU TO PAY FOR THE VERY BASICS LIKE FOOD, HOUSING, MEDICAL CARE, AND HEATING?: NOT HARD AT ALL

## 2023-03-27 ASSESSMENT — LIFESTYLE VARIABLES: HOW OFTEN DO YOU HAVE A DRINK CONTAINING ALCOHOL: NEVER

## 2023-03-27 ASSESSMENT — PATIENT HEALTH QUESTIONNAIRE - PHQ9
SUM OF ALL RESPONSES TO PHQ QUESTIONS 1-9: 0
2. FEELING DOWN, DEPRESSED OR HOPELESS: 0
SUM OF ALL RESPONSES TO PHQ9 QUESTIONS 1 & 2: 0
1. LITTLE INTEREST OR PLEASURE IN DOING THINGS: 0
2. FEELING DOWN, DEPRESSED OR HOPELESS: 0
SUM OF ALL RESPONSES TO PHQ9 QUESTIONS 1 & 2: 0
SUM OF ALL RESPONSES TO PHQ QUESTIONS 1-9: 0
1. LITTLE INTEREST OR PLEASURE IN DOING THINGS: 0
SUM OF ALL RESPONSES TO PHQ QUESTIONS 1-9: 0
SUM OF ALL RESPONSES TO PHQ QUESTIONS 1-9: 0

## 2023-03-27 NOTE — PROGRESS NOTES
Comprehensive Metabolic Panel; Future  -     TSH with Reflex; Future  -     Lipid Panel; Future  8. Obstructive sleep apnea (adult) (pediatric)  Overview:  Sleep study      9. Attention-deficit hyperactivity disorder, predominantly inattentive type  Overview:  Seeing psychiatrist  Stable on med         Orders Placed This Encounter    COMPREHENSIVE METABOLIC PANEL    LIPID PANEL    T4, FREE    TSH 3RD GENERATION    levothyroxine (SYNTHROID) 75 mcg tablet     Sig: TAKE 1 TABLET BY MOUTH ONCE DAILY BEFORE  BREAKFAST     Dispense:  90 Tablet     Refill:  0    amLODIPine (NORVASC) 10 mg tablet     Sig: Take 1 Tablet by mouth daily. Dispense:  90 Tablet     Refill:  0     Please consider 90 day supplies to promote better adherence    metoprolol tartrate (LOPRESSOR) 25 mg tablet     Sig: Take 1 Tablet by mouth two (2) times a day. Dispense:  180 Tablet     Refill:  0    simvastatin (ZOCOR) 40 mg tablet     Sig: TAKE 1 TABLET BY MOUTH ONCE DAILY AT  NIGHT     Dispense:  90 Tablet     Refill:  0     Results for orders placed or performed in visit on 01/06/20   TSH 3RD GENERATION   Result Value Ref Range    TSH 3.910 0.450 - 7.659 uIU/mL   METABOLIC PANEL, COMPREHENSIVE   Result Value Ref Range    Glucose 127 (H) 65 - 99 mg/dL    BUN 13 6 - 20 mg/dL    Creatinine 0.95 0.76 - 1.27 mg/dL    GFR est non- >59 mL/min/1.73    GFR est  >59 mL/min/1.73    BUN/Creatinine ratio 14 9 - 20    Sodium 145 (H) 134 - 144 mmol/L    Potassium 4.1 3.5 - 5.2 mmol/L    Chloride 106 96 - 106 mmol/L    CO2 19 (L) 20 - 29 mmol/L    Calcium 10.2 8.7 - 10.2 mg/dL    Protein, total 7.9 6.0 - 8.5 g/dL    Albumin 5.0 3.5 - 5.5 g/dL    GLOBULIN, TOTAL 2.9 1.5 - 4.5 g/dL    A-G Ratio 1.7 1.2 - 2.2    Bilirubin, total 0.4 0.0 - 1.2 mg/dL    Alk.  phosphatase 76 39 - 117 IU/L    AST (SGOT) 22 0 - 40 IU/L    ALT (SGPT) 40 0 - 44 IU/L   LIPID PANEL   Result Value Ref Range    Cholesterol, total 178 100 - 199 mg/dL    Triglyceride 171 (H) 0 -

## 2023-03-27 NOTE — PATIENT INSTRUCTIONS
[place] [employer] online. You need 0711-1672 mg of calcium and 7927-1835 IU of vitamin D per day. It is possible to meet your calcium requirement with diet alone, but a vitamin D supplement is usually necessary to meet this goal.  When exposed to the sun, use a sunscreen that protects against both UVA and UVB radiation with an SPF of 30 or greater. Reapply every 2 to 3 hours or after sweating, drying off with a towel, or swimming. Always wear a seat belt when traveling in a car. Always wear a helmet when riding a bicycle or motorcycle.

## 2023-05-04 ENCOUNTER — TELEPHONE (OUTPATIENT)
Dept: PRIMARY CARE CLINIC | Facility: CLINIC | Age: 33
End: 2023-05-04

## 2023-05-04 NOTE — TELEPHONE ENCOUNTER
----- Message from Mark Graham sent at 5/1/2023  9:37 AM EDT -----  Subject: Message to Provider    QUESTIONS  Information for Provider? Pt would like PCP to write an order for a C Pap   Machine (for pt sleep apena). This needs to be sent to [facility] in [facility]. Fax number? [phone]. Please contact once fax has   been sent.   ---------------------------------------------------------------------------  --------------  [facility]  [phone]; OK to leave message on voicemail  ---------------------------------------------------------------------------  --------------  SCRIPT ANSWERS  Relationship to Patient?  Self

## 2023-05-04 NOTE — TELEPHONE ENCOUNTER
Patient has a sleep doctor. I advised patient to call the specialist to request the prescription. Dr. Mehran Harvey does not prescribe CPAP supplies.

## 2023-06-15 DIAGNOSIS — E78.5 HYPERLIPIDEMIA, UNSPECIFIED HYPERLIPIDEMIA TYPE: ICD-10-CM

## 2023-06-15 DIAGNOSIS — I10 ESSENTIAL (PRIMARY) HYPERTENSION: ICD-10-CM

## 2023-06-15 DIAGNOSIS — E03.9 HYPOTHYROIDISM, UNSPECIFIED TYPE: ICD-10-CM

## 2023-06-15 RX ORDER — AMLODIPINE BESYLATE 10 MG/1
TABLET ORAL
Qty: 90 TABLET | Refills: 0 | OUTPATIENT
Start: 2023-06-15

## 2023-06-15 RX ORDER — SIMVASTATIN 40 MG
TABLET ORAL
Qty: 90 TABLET | Refills: 0 | OUTPATIENT
Start: 2023-06-15

## 2023-06-15 RX ORDER — LEVOTHYROXINE SODIUM 0.07 MG/1
TABLET ORAL
Qty: 90 TABLET | Refills: 0 | OUTPATIENT
Start: 2023-06-15

## 2023-08-04 ENCOUNTER — TELEPHONE (OUTPATIENT)
Dept: PRIMARY CARE CLINIC | Facility: CLINIC | Age: 33
End: 2023-08-04

## 2023-08-04 NOTE — TELEPHONE ENCOUNTER
Returned call to patient, she stated he has enough medication until his next appointment on 8/18/23. No need for refill at this time.

## 2023-08-04 NOTE — TELEPHONE ENCOUNTER
----- Message from Babar Nash sent at 8/4/2023  1:09 PM EDT -----  Subject: Refill Request    QUESTIONS  Name of Medication? amLODIPine (NORVASC) 10 MG tablet  Patient-reported dosage and instructions? 1 tablet daily  How many days do you have left? 0  Preferred Pharmacy? [facility]  Pharmacy phone number (if available)? [phone]  ---------------------------------------------------------------------------  --------------,  Name of Medication? levothyroxine (SYNTHROID) 75 MCG tablet  Patient-reported dosage and instructions? 1 tablet daily  How many days do you have left? 2  Preferred Pharmacy? [address] [facility]  Pharmacy phone number (if available)? [phone]  ---------------------------------------------------------------------------  --------------,  Name of Medication? simvastatin (ZOCOR) 40 MG tablet  Patient-reported dosage and instructions? 1 tablet daily  How many days do you have left? 0  Preferred Pharmacy? [facility]  Pharmacy phone number (if available)? [phone]  ---------------------------------------------------------------------------  --------------,  Name of Medication? metoprolol tartrate (LOPRESSOR) 25 MG tablet  Patient-reported dosage and instructions? 1 tablet twice daily  How many days do you have left? 2  Preferred Pharmacy? [address] [facility]  Pharmacy phone number (if available)? [phone]  ---------------------------------------------------------------------------  --------------  Brigitte BERNABE  What is the best way for the office to contact you? OK to leave message on   voicemail  Preferred Call Back Phone Number? [phone]  ---------------------------------------------------------------------------  --------------  SCRIPT ANSWERS  Relationship to Patient?  Self

## 2023-09-18 ENCOUNTER — TELEMEDICINE (OUTPATIENT)
Dept: PRIMARY CARE CLINIC | Facility: CLINIC | Age: 33
End: 2023-09-18
Payer: MEDICARE

## 2023-09-18 DIAGNOSIS — Z71.3 DIETARY COUNSELING AND SURVEILLANCE: ICD-10-CM

## 2023-09-18 DIAGNOSIS — G47.33 OBSTRUCTIVE SLEEP APNEA (ADULT) (PEDIATRIC): ICD-10-CM

## 2023-09-18 DIAGNOSIS — F90.0 ATTENTION-DEFICIT HYPERACTIVITY DISORDER, PREDOMINANTLY INATTENTIVE TYPE: ICD-10-CM

## 2023-09-18 DIAGNOSIS — E78.5 HYPERLIPIDEMIA, UNSPECIFIED HYPERLIPIDEMIA TYPE: ICD-10-CM

## 2023-09-18 DIAGNOSIS — E03.9 HYPOTHYROIDISM, UNSPECIFIED TYPE: ICD-10-CM

## 2023-09-18 DIAGNOSIS — J30.9 ALLERGIC RHINITIS, UNSPECIFIED SEASONALITY, UNSPECIFIED TRIGGER: ICD-10-CM

## 2023-09-18 DIAGNOSIS — E66.01 MORBID (SEVERE) OBESITY DUE TO EXCESS CALORIES (HCC): ICD-10-CM

## 2023-09-18 DIAGNOSIS — I10 ESSENTIAL (PRIMARY) HYPERTENSION: Primary | ICD-10-CM

## 2023-09-18 PROCEDURE — 4004F PT TOBACCO SCREEN RCVD TLK: CPT | Performed by: FAMILY MEDICINE

## 2023-09-18 PROCEDURE — G8421 BMI NOT CALCULATED: HCPCS | Performed by: FAMILY MEDICINE

## 2023-09-18 PROCEDURE — 99214 OFFICE O/P EST MOD 30 MIN: CPT | Performed by: FAMILY MEDICINE

## 2023-09-18 PROCEDURE — G8427 DOCREV CUR MEDS BY ELIG CLIN: HCPCS | Performed by: FAMILY MEDICINE

## 2023-09-18 RX ORDER — SIMVASTATIN 40 MG
40 TABLET ORAL NIGHTLY
Qty: 90 TABLET | Refills: 1 | Status: SHIPPED | OUTPATIENT
Start: 2023-09-18

## 2023-09-18 RX ORDER — AMLODIPINE BESYLATE 10 MG/1
10 TABLET ORAL DAILY
Qty: 90 TABLET | Refills: 1 | Status: SHIPPED | OUTPATIENT
Start: 2023-09-18

## 2023-09-18 RX ORDER — LEVOTHYROXINE SODIUM 0.07 MG/1
75 TABLET ORAL
Qty: 90 TABLET | Refills: 1 | Status: SHIPPED | OUTPATIENT
Start: 2023-09-18

## 2023-09-18 NOTE — PROGRESS NOTES
bp 130/70    [facility] [address], [address] [place] [address]  Office : [phone]  Fax : [phone]      Pt was seen by synchronous (real-time) audio-video technology   I was at my office while conducting this encounter  Pts  healthcare decision maker is aware that this patient-initiated Telehealth encounter is a billable service, with coverage as determined by her insurance carrier. She is aware that she may receive a bill and has provided verbal consent to proceed:         Subjective: The patient is a 27 y.o. male  who presents for f/u on multiple chronic medical conditions-good compliance with medications-no new concerns-pt here to get routeine labs and need refill on meds. no cardiopulmonary symptoms  Hypertension--Pt BP been controlled with meds  Prediabetes -pts blood sugar stable on med  Hyperlipidemia--pts on low carb diet and low fat diet -stable on med  Gerd -stable on diet /med  Thyroid problem- stable  Pt uses cpap for JATIN  Pt not done lab work for a while-pt to do the same soon          Patient Active Problem List   Diagnosis Code    ADHD (attention deficit hyperactivity disorder) F90.9    Hyperlipidemia E78.5    Morbid obesity with BMI of 40.0-44.9, adult ([address]) E66.01, Z68.41    Psychiatric disorder F99    Tachycardia R00.0    Peripheral edema R60.9    Acquired hypothyroidism E03.9    Dietary counseling Z71.3    Medicare annual wellness visit, subsequent Z00.00    BMI 40.0-44.9, adult ([address]) Z68.41    Essential hypertension I10    JATIN (obstructive sleep apnea) G47.33    Nocturnal hypoxemia G47.34    Hypersomnia G47.10       Past Medical History:   Diagnosis Date    Acute sinusitis     ADHD (attention deficit hyperactivity disorder) 11/23/2016    Anxiety     Essential hypertension 11/23/2016    Hyperlipidemia 11/23/2016    Hypothyroidism 11/23/2016    Morbid obesity ([address]) 11/23/2016    Psychiatric disorder 11/23/2016    --EXACT NATURE UNKNOWN    Tachycardia 2/10/2017

## 2024-03-07 DIAGNOSIS — I10 ESSENTIAL (PRIMARY) HYPERTENSION: ICD-10-CM

## 2024-03-07 RX ORDER — AMLODIPINE BESYLATE 10 MG/1
10 TABLET ORAL DAILY
Qty: 90 TABLET | Refills: 0 | OUTPATIENT
Start: 2024-03-07

## 2024-04-16 ENCOUNTER — TELEPHONE (OUTPATIENT)
Dept: PRIMARY CARE CLINIC | Facility: CLINIC | Age: 34
End: 2024-04-16

## 2024-04-16 DIAGNOSIS — I10 ESSENTIAL (PRIMARY) HYPERTENSION: ICD-10-CM

## 2024-04-16 RX ORDER — AMLODIPINE BESYLATE 10 MG/1
10 TABLET ORAL DAILY
Qty: 90 TABLET | Refills: 0 | Status: SHIPPED | OUTPATIENT
Start: 2024-04-16 | End: 2024-04-17 | Stop reason: SDUPTHER

## 2024-04-16 NOTE — TELEPHONE ENCOUNTER
----- Message from Lexie Colbert sent at 4/16/2024 10:38 AM EDT -----  Subject: Refill Request    QUESTIONS  Name of Medication? amLODIPine (NORVASC) 10 MG tablet  Patient-reported dosage and instructions? Take 1 tablet by mouth daily  How many days do you have left? 0  Preferred Pharmacy? [facility]  Pharmacy phone number (if available)? [phone]  Additional Information for Provider? Patient has a follow up appointment   with PCP, Kerrie Cardoza on 4.19.2024 but is currently out of medication   and is asking if PCP will send over a script.   ---------------------------------------------------------------------------  --------------,  Name of Medication? levothyroxine (SYNTHROID) 75 MCG tablet  Patient-reported dosage and instructions? Take 1 tablet by mouth every   morning (before breakfast)  How many days do you have left? 0  Preferred Pharmacy? [facility]  Pharmacy phone number (if available)? [phone]  Additional Information for Provider? Patient has a follow up appointment   with PCP, Kerrie Cardoza on 4.19.2024 but is currently out of medication   and is asking if PCP will send over a script.   ---------------------------------------------------------------------------  --------------,  Name of Medication? simvastatin (ZOCOR) 40 MG tablet  Patient-reported dosage and instructions? Take 1 tablet by mouth nightly  How many days do you have left? 0  Preferred Pharmacy? [facility]  Pharmacy phone number (if available)? [phone]  Additional Information for Provider? Patient has a follow up appointment   with PCP, Kerrie Cardoza on 4.19.2024 but is currently out of medication   and is asking if PCP will send over a script.   ---------------------------------------------------------------------------  --------------,  Name of Medication? metoprolol tartrate (LOPRESSOR) 25 MG tablet  Patient-reported dosage and instructions? Take 1 tablet by mouth 2 times

## 2024-04-17 DIAGNOSIS — E03.9 HYPOTHYROIDISM, UNSPECIFIED TYPE: ICD-10-CM

## 2024-04-17 DIAGNOSIS — E78.5 HYPERLIPIDEMIA, UNSPECIFIED HYPERLIPIDEMIA TYPE: ICD-10-CM

## 2024-04-17 DIAGNOSIS — I10 ESSENTIAL (PRIMARY) HYPERTENSION: ICD-10-CM

## 2024-04-17 RX ORDER — AMLODIPINE BESYLATE 10 MG/1
10 TABLET ORAL DAILY
Qty: 90 TABLET | Refills: 0 | Status: SHIPPED | OUTPATIENT
Start: 2024-04-17

## 2024-04-17 RX ORDER — SIMVASTATIN 40 MG
40 TABLET ORAL NIGHTLY
Qty: 90 TABLET | Refills: 1 | Status: SHIPPED | OUTPATIENT
Start: 2024-04-17

## 2024-04-17 RX ORDER — LEVOTHYROXINE SODIUM 0.07 MG/1
75 TABLET ORAL
Qty: 90 TABLET | Refills: 1 | Status: SHIPPED | OUTPATIENT
Start: 2024-04-17

## 2024-08-15 ENCOUNTER — TELEPHONE (OUTPATIENT)
Dept: PRIMARY CARE CLINIC | Facility: CLINIC | Age: 34
End: 2024-08-15

## 2024-08-15 NOTE — TELEPHONE ENCOUNTER
Pt received a letter to attend [facility] on 09/15/2024 @ 9 am. Pt mother states he can not go.  Pt requesting a letter  from pcp to be excuse from attending.  Please call mother at [phone].

## 2024-08-16 ENCOUNTER — PATIENT MESSAGE (OUTPATIENT)
Dept: PRIMARY CARE CLINIC | Facility: CLINIC | Age: 34
End: 2024-08-16

## 2024-08-21 ENCOUNTER — TELEMEDICINE (OUTPATIENT)
Dept: PRIMARY CARE CLINIC | Facility: CLINIC | Age: 34
End: 2024-08-21

## 2024-08-21 DIAGNOSIS — Z00.00 MEDICARE ANNUAL WELLNESS VISIT, SUBSEQUENT: Primary | ICD-10-CM

## 2024-08-21 DIAGNOSIS — Z71.3 DIETARY COUNSELING AND SURVEILLANCE: ICD-10-CM

## 2024-08-21 DIAGNOSIS — E78.5 HYPERLIPIDEMIA, UNSPECIFIED HYPERLIPIDEMIA TYPE: ICD-10-CM

## 2024-08-21 DIAGNOSIS — G47.33 OBSTRUCTIVE SLEEP APNEA (ADULT) (PEDIATRIC): ICD-10-CM

## 2024-08-21 DIAGNOSIS — J30.9 ALLERGIC RHINITIS, UNSPECIFIED SEASONALITY, UNSPECIFIED TRIGGER: ICD-10-CM

## 2024-08-21 DIAGNOSIS — E66.01 MORBID (SEVERE) OBESITY DUE TO EXCESS CALORIES (HCC): ICD-10-CM

## 2024-08-21 DIAGNOSIS — E03.9 HYPOTHYROIDISM, UNSPECIFIED TYPE: ICD-10-CM

## 2024-08-21 DIAGNOSIS — I10 ESSENTIAL (PRIMARY) HYPERTENSION: ICD-10-CM

## 2024-08-21 RX ORDER — SIMVASTATIN 40 MG
40 TABLET ORAL NIGHTLY
Qty: 90 TABLET | Refills: 1 | Status: SHIPPED | OUTPATIENT
Start: 2024-08-21

## 2024-08-21 RX ORDER — LEVOTHYROXINE SODIUM 0.07 MG/1
75 TABLET ORAL
Qty: 90 TABLET | Refills: 0 | Status: SHIPPED | OUTPATIENT
Start: 2024-08-21

## 2024-08-21 RX ORDER — AMLODIPINE BESYLATE 10 MG/1
10 TABLET ORAL DAILY
Qty: 90 TABLET | Refills: 0 | Status: SHIPPED | OUTPATIENT
Start: 2024-08-21

## 2024-08-21 NOTE — PROGRESS NOTES
[facility]  [address]  [place] [address]  Office : [phone]  Fax : [phone]    Medicare Annual Wellness Visit    Girish Jay is here for Medicare AWV      Assessment & Plan   Assessment & Plan  Medicare annual wellness visit, subsequent       Orders:    CBC with Auto Differential; Future    Lipid Panel; Future    TSH with Reflex; Future    Comprehensive Metabolic Panel; Future    Essential (primary) hypertension       Orders:    amLODIPine (NORVASC) 10 MG tablet; Take 1 tablet by mouth daily    metoprolol tartrate (LOPRESSOR) 25 MG tablet; Take 1 tablet by mouth 2 times daily    CBC with Auto Differential; Future    Lipid Panel; Future    TSH with Reflex; Future    Comprehensive Metabolic Panel; Future    Hypothyroidism, unspecified type       Orders:    levothyroxine (SYNTHROID) 75 MCG tablet; Take 1 tablet by mouth every morning (before breakfast)    CBC with Auto Differential; Future    Lipid Panel; Future    TSH with Reflex; Future    Comprehensive Metabolic Panel; Future    Hyperlipidemia, unspecified hyperlipidemia type       Orders:    simvastatin (ZOCOR) 40 MG tablet; Take 1 tablet by mouth nightly    CBC with Auto Differential; Future    Lipid Panel; Future    TSH with Reflex; Future    Comprehensive Metabolic Panel; Future    Allergic rhinitis, unspecified seasonality, unspecified trigger       Orders:    CBC with Auto Differential; Future    Lipid Panel; Future    TSH with Reflex; Future    Comprehensive Metabolic Panel; Future    Obstructive sleep apnea (adult) (pediatric)       Orders:    CBC with Auto Differential; Future    Lipid Panel; Future    TSH with Reflex; Future    Comprehensive Metabolic Panel; Future    Morbid (severe) obesity due to excess calories (HCC)       Orders:    CBC with Auto Differential; Future    Lipid Panel; Future    TSH with Reflex; Future    Comprehensive Metabolic Panel; Future    Dietary counseling and surveillance

## 2024-10-04 DIAGNOSIS — I10 ESSENTIAL (PRIMARY) HYPERTENSION: ICD-10-CM

## 2024-10-07 RX ORDER — AMLODIPINE BESYLATE 10 MG/1
10 TABLET ORAL DAILY
Qty: 90 TABLET | Refills: 0 | OUTPATIENT
Start: 2024-10-07

## 2025-06-14 DIAGNOSIS — E78.5 HYPERLIPIDEMIA, UNSPECIFIED HYPERLIPIDEMIA TYPE: ICD-10-CM

## 2025-06-16 RX ORDER — SIMVASTATIN 40 MG
40 TABLET ORAL NIGHTLY
Qty: 90 TABLET | Refills: 0 | OUTPATIENT
Start: 2025-06-16